# Patient Record
Sex: FEMALE | URBAN - METROPOLITAN AREA
[De-identification: names, ages, dates, MRNs, and addresses within clinical notes are randomized per-mention and may not be internally consistent; named-entity substitution may affect disease eponyms.]

---

## 2023-04-11 ENCOUNTER — APPOINTMENT (OUTPATIENT)
Dept: URBAN - METROPOLITAN AREA CLINIC 218 | Age: 88
Setting detail: DERMATOLOGY
End: 2023-04-12

## 2023-04-11 DIAGNOSIS — D36.1 BENIGN NEOPLASM OF PERIPHERAL NERVES AND AUTONOMIC NERVOUS SYSTEM: ICD-10-CM

## 2023-04-11 DIAGNOSIS — Z71.89 OTHER SPECIFIED COUNSELING: ICD-10-CM

## 2023-04-11 DIAGNOSIS — L57.8 OTHER SKIN CHANGES DUE TO CHRONIC EXPOSURE TO NONIONIZING RADIATION: ICD-10-CM

## 2023-04-11 DIAGNOSIS — L82.1 OTHER SEBORRHEIC KERATOSIS: ICD-10-CM

## 2023-04-11 DIAGNOSIS — L85.3 XEROSIS CUTIS: ICD-10-CM

## 2023-04-11 DIAGNOSIS — Z85.828 PERSONAL HISTORY OF OTHER MALIGNANT NEOPLASM OF SKIN: ICD-10-CM

## 2023-04-11 DIAGNOSIS — D18.0 HEMANGIOMA: ICD-10-CM

## 2023-04-11 DIAGNOSIS — L73.8 OTHER SPECIFIED FOLLICULAR DISORDERS: ICD-10-CM

## 2023-04-11 DIAGNOSIS — L57.0 ACTINIC KERATOSIS: ICD-10-CM

## 2023-04-11 PROBLEM — D23.71 OTHER BENIGN NEOPLASM OF SKIN OF RIGHT LOWER LIMB, INCLUDING HIP: Status: ACTIVE | Noted: 2023-04-11

## 2023-04-11 PROBLEM — D23.72 OTHER BENIGN NEOPLASM OF SKIN OF LEFT LOWER LIMB, INCLUDING HIP: Status: ACTIVE | Noted: 2023-04-11

## 2023-04-11 PROBLEM — C44.311 BASAL CELL CARCINOMA OF SKIN OF NOSE: Status: ACTIVE | Noted: 2023-04-11

## 2023-04-11 PROBLEM — D18.01 HEMANGIOMA OF SKIN AND SUBCUTANEOUS TISSUE: Status: ACTIVE | Noted: 2023-04-11

## 2023-04-11 PROBLEM — D36.12 BENIGN NEOPLASM OF PERIPHERAL NERVES AND AUTONOMIC NERVOUS SYSTEM, UPPER LIMB, INCLUDING SHOULDER: Status: ACTIVE | Noted: 2023-04-11

## 2023-04-11 PROCEDURE — OTHER LIQUID NITROGEN: OTHER

## 2023-04-11 PROCEDURE — OTHER MONITORING: OTHER

## 2023-04-11 PROCEDURE — 99213 OFFICE O/P EST LOW 20 MIN: CPT | Mod: 25

## 2023-04-11 PROCEDURE — OTHER COUNSELING: OTHER

## 2023-04-11 PROCEDURE — 17000 DESTRUCT PREMALG LESION: CPT

## 2023-04-11 ASSESSMENT — LOCATION DETAILED DESCRIPTION DERM
LOCATION DETAILED: LEFT SUPERIOR FOREHEAD
LOCATION DETAILED: INFERIOR MID FOREHEAD
LOCATION DETAILED: LEFT ANTERIOR DISTAL UPPER ARM
LOCATION DETAILED: RIGHT INFERIOR MEDIAL UPPER BACK
LOCATION DETAILED: LOWER STERNUM
LOCATION DETAILED: RIGHT SUPERIOR MEDIAL UPPER BACK
LOCATION DETAILED: LEFT INFERIOR MEDIAL FOREHEAD
LOCATION DETAILED: RIGHT ANTERIOR DISTAL UPPER ARM
LOCATION DETAILED: LEFT SUPERIOR ANTERIOR NECK
LOCATION DETAILED: UPPER STERNUM
LOCATION DETAILED: RIGHT NASAL ALA
LOCATION DETAILED: LEFT LATERAL SUPERIOR CHEST
LOCATION DETAILED: RIGHT VENTRAL DISTAL FOREARM
LOCATION DETAILED: INFERIOR THORACIC SPINE
LOCATION DETAILED: RIGHT MEDIAL FRONTAL SCALP

## 2023-04-11 ASSESSMENT — LOCATION SIMPLE DESCRIPTION DERM
LOCATION SIMPLE: LEFT UPPER ARM
LOCATION SIMPLE: RIGHT NOSE
LOCATION SIMPLE: RIGHT SCALP
LOCATION SIMPLE: RIGHT UPPER BACK
LOCATION SIMPLE: RIGHT UPPER ARM
LOCATION SIMPLE: INFERIOR FOREHEAD
LOCATION SIMPLE: RIGHT FOREARM
LOCATION SIMPLE: UPPER BACK
LOCATION SIMPLE: LEFT FOREHEAD
LOCATION SIMPLE: LEFT ANTERIOR NECK
LOCATION SIMPLE: CHEST

## 2023-04-11 ASSESSMENT — LOCATION ZONE DERM
LOCATION ZONE: NOSE
LOCATION ZONE: NECK
LOCATION ZONE: TRUNK
LOCATION ZONE: FACE
LOCATION ZONE: SCALP
LOCATION ZONE: ARM

## 2023-04-11 NOTE — PROCEDURE: LIQUID NITROGEN
Detail Level: Detailed
Post-Care Instructions: I reviewed with the patient in detail post-care instructions. Patient is to wear sunprotection, and avoid picking at any of the treated lesions. Pt may apply Vaseline to crusted or scabbing areas.
Consent: The patient's verbal consent was obtained including but not limited to risks of crusting, scabbing, blistering, scarring, darker or lighter pigmentary change.
Show Aperture Variable?: Yes
Duration Of Freeze Thaw-Cycle (Seconds): 2
Number Of Freeze-Thaw Cycles: 1 freeze-thaw cycle
Render Note In Bullet Format When Appropriate: No
Application Tool (Optional): Cry-AC

## 2024-04-15 ENCOUNTER — APPOINTMENT (OUTPATIENT)
Dept: URBAN - METROPOLITAN AREA CLINIC 218 | Age: 89
Setting detail: DERMATOLOGY
End: 2024-04-15

## 2024-04-15 DIAGNOSIS — D36.1 BENIGN NEOPLASM OF PERIPHERAL NERVES AND AUTONOMIC NERVOUS SYSTEM: ICD-10-CM

## 2024-04-15 DIAGNOSIS — Z86.007 PERSONAL HISTORY OF IN-SITU NEOPLASM OF SKIN: ICD-10-CM

## 2024-04-15 DIAGNOSIS — L73.8 OTHER SPECIFIED FOLLICULAR DISORDERS: ICD-10-CM

## 2024-04-15 DIAGNOSIS — L82.1 OTHER SEBORRHEIC KERATOSIS: ICD-10-CM

## 2024-04-15 DIAGNOSIS — L81.7 PIGMENTED PURPURIC DERMATOSIS: ICD-10-CM

## 2024-04-15 DIAGNOSIS — Z85.828 PERSONAL HISTORY OF OTHER MALIGNANT NEOPLASM OF SKIN: ICD-10-CM

## 2024-04-15 DIAGNOSIS — L57.8 OTHER SKIN CHANGES DUE TO CHRONIC EXPOSURE TO NONIONIZING RADIATION: ICD-10-CM

## 2024-04-15 DIAGNOSIS — L90.5 SCAR CONDITIONS AND FIBROSIS OF SKIN: ICD-10-CM

## 2024-04-15 DIAGNOSIS — D18.0 HEMANGIOMA: ICD-10-CM

## 2024-04-15 PROBLEM — C44.311 BASAL CELL CARCINOMA OF SKIN OF NOSE: Status: ACTIVE | Noted: 2024-04-15

## 2024-04-15 PROBLEM — D36.12 BENIGN NEOPLASM OF PERIPHERAL NERVES AND AUTONOMIC NERVOUS SYSTEM, UPPER LIMB, INCLUDING SHOULDER: Status: ACTIVE | Noted: 2024-04-15

## 2024-04-15 PROBLEM — D23.71 OTHER BENIGN NEOPLASM OF SKIN OF RIGHT LOWER LIMB, INCLUDING HIP: Status: ACTIVE | Noted: 2024-04-15

## 2024-04-15 PROBLEM — D18.01 HEMANGIOMA OF SKIN AND SUBCUTANEOUS TISSUE: Status: ACTIVE | Noted: 2024-04-15

## 2024-04-15 PROBLEM — D23.72 OTHER BENIGN NEOPLASM OF SKIN OF LEFT LOWER LIMB, INCLUDING HIP: Status: ACTIVE | Noted: 2024-04-15

## 2024-04-15 PROCEDURE — OTHER MONITORING: OTHER

## 2024-04-15 PROCEDURE — OTHER COUNSELING: OTHER

## 2024-04-15 PROCEDURE — 99213 OFFICE O/P EST LOW 20 MIN: CPT

## 2024-04-15 ASSESSMENT — LOCATION DETAILED DESCRIPTION DERM
LOCATION DETAILED: LEFT ANTERIOR DISTAL UPPER ARM
LOCATION DETAILED: RIGHT DISTAL PRETIBIAL REGION
LOCATION DETAILED: LEFT CENTRAL MALAR CHEEK
LOCATION DETAILED: RIGHT INFERIOR CENTRAL MALAR CHEEK
LOCATION DETAILED: RIGHT SUPERIOR MEDIAL UPPER BACK
LOCATION DETAILED: LEFT SUPERIOR ANTERIOR NECK
LOCATION DETAILED: RIGHT INFERIOR MEDIAL UPPER BACK
LOCATION DETAILED: RIGHT SUPERIOR PREAURICULAR CHEEK
LOCATION DETAILED: RIGHT VENTRAL DISTAL FOREARM
LOCATION DETAILED: RIGHT ANTERIOR DISTAL UPPER ARM
LOCATION DETAILED: LEFT SUPERIOR PREAURICULAR CHEEK
LOCATION DETAILED: INFERIOR MID FOREHEAD
LOCATION DETAILED: RIGHT CENTRAL MALAR CHEEK
LOCATION DETAILED: LEFT DISTAL PRETIBIAL REGION
LOCATION DETAILED: RIGHT CENTRAL PARIETAL SCALP
LOCATION DETAILED: MID POSTERIOR NECK
LOCATION DETAILED: LOWER STERNUM
LOCATION DETAILED: RIGHT MEDIAL FRONTAL SCALP
LOCATION DETAILED: LEFT INFERIOR MEDIAL FOREHEAD
LOCATION DETAILED: UPPER STERNUM
LOCATION DETAILED: LEFT CHIN

## 2024-04-15 ASSESSMENT — LOCATION SIMPLE DESCRIPTION DERM
LOCATION SIMPLE: INFERIOR FOREHEAD
LOCATION SIMPLE: CHIN
LOCATION SIMPLE: POSTERIOR NECK
LOCATION SIMPLE: RIGHT PRETIBIAL REGION
LOCATION SIMPLE: LEFT ANTERIOR NECK
LOCATION SIMPLE: RIGHT CHEEK
LOCATION SIMPLE: SCALP
LOCATION SIMPLE: CHEST
LOCATION SIMPLE: LEFT CHEEK
LOCATION SIMPLE: RIGHT SCALP
LOCATION SIMPLE: LEFT UPPER ARM
LOCATION SIMPLE: RIGHT FOREARM
LOCATION SIMPLE: LEFT FOREHEAD
LOCATION SIMPLE: RIGHT UPPER ARM
LOCATION SIMPLE: RIGHT UPPER BACK
LOCATION SIMPLE: LEFT PRETIBIAL REGION

## 2024-04-15 ASSESSMENT — LOCATION ZONE DERM
LOCATION ZONE: ARM
LOCATION ZONE: LEG
LOCATION ZONE: SCALP
LOCATION ZONE: FACE
LOCATION ZONE: NECK
LOCATION ZONE: TRUNK

## 2024-04-15 NOTE — PROCEDURE: COUNSELING
Detail Level: Zone
Detail Level: Detailed
Patient Specific Counseling (Will Not Stick From Patient To Patient): Patient understands the risks of not treating the BCC.  She agrees to routine monitoring Q6 months, will RTO if lesion becomes bothersome or grows.

## 2024-10-15 ENCOUNTER — APPOINTMENT (OUTPATIENT)
Dept: URBAN - METROPOLITAN AREA CLINIC 218 | Age: 89
Setting detail: DERMATOLOGY
End: 2024-10-16

## 2024-10-15 DIAGNOSIS — D36.1 BENIGN NEOPLASM OF PERIPHERAL NERVES AND AUTONOMIC NERVOUS SYSTEM: ICD-10-CM

## 2024-10-15 DIAGNOSIS — Z86.007 PERSONAL HISTORY OF IN-SITU NEOPLASM OF SKIN: ICD-10-CM

## 2024-10-15 DIAGNOSIS — D18.0 HEMANGIOMA: ICD-10-CM

## 2024-10-15 DIAGNOSIS — L57.8 OTHER SKIN CHANGES DUE TO CHRONIC EXPOSURE TO NONIONIZING RADIATION: ICD-10-CM

## 2024-10-15 DIAGNOSIS — L81.7 PIGMENTED PURPURIC DERMATOSIS: ICD-10-CM

## 2024-10-15 DIAGNOSIS — L82.1 OTHER SEBORRHEIC KERATOSIS: ICD-10-CM

## 2024-10-15 DIAGNOSIS — Z85.828 PERSONAL HISTORY OF OTHER MALIGNANT NEOPLASM OF SKIN: ICD-10-CM

## 2024-10-15 DIAGNOSIS — L73.8 OTHER SPECIFIED FOLLICULAR DISORDERS: ICD-10-CM

## 2024-10-15 DIAGNOSIS — D485 NEOPLASM OF UNCERTAIN BEHAVIOR OF SKIN: ICD-10-CM

## 2024-10-15 PROBLEM — D18.01 HEMANGIOMA OF SKIN AND SUBCUTANEOUS TISSUE: Status: ACTIVE | Noted: 2024-10-15

## 2024-10-15 PROBLEM — D23.72 OTHER BENIGN NEOPLASM OF SKIN OF LEFT LOWER LIMB, INCLUDING HIP: Status: ACTIVE | Noted: 2024-10-15

## 2024-10-15 PROBLEM — D23.71 OTHER BENIGN NEOPLASM OF SKIN OF RIGHT LOWER LIMB, INCLUDING HIP: Status: ACTIVE | Noted: 2024-10-15

## 2024-10-15 PROBLEM — D48.5 NEOPLASM OF UNCERTAIN BEHAVIOR OF SKIN: Status: ACTIVE | Noted: 2024-10-15

## 2024-10-15 PROBLEM — C44.311 BASAL CELL CARCINOMA OF SKIN OF NOSE: Status: ACTIVE | Noted: 2024-10-15

## 2024-10-15 PROBLEM — D36.12 BENIGN NEOPLASM OF PERIPHERAL NERVES AND AUTONOMIC NERVOUS SYSTEM, UPPER LIMB, INCLUDING SHOULDER: Status: ACTIVE | Noted: 2024-10-15

## 2024-10-15 PROCEDURE — 11102 TANGNTL BX SKIN SINGLE LES: CPT

## 2024-10-15 PROCEDURE — OTHER BIOPSY BY SHAVE METHOD: OTHER

## 2024-10-15 PROCEDURE — OTHER COUNSELING: OTHER

## 2024-10-15 PROCEDURE — OTHER MONITORING: OTHER

## 2024-10-15 PROCEDURE — 99213 OFFICE O/P EST LOW 20 MIN: CPT | Mod: 25

## 2024-10-15 ASSESSMENT — LOCATION SIMPLE DESCRIPTION DERM
LOCATION SIMPLE: RIGHT UPPER BACK
LOCATION SIMPLE: LEFT PRETIBIAL REGION
LOCATION SIMPLE: INFERIOR FOREHEAD
LOCATION SIMPLE: RIGHT UPPER ARM
LOCATION SIMPLE: RIGHT FOREARM
LOCATION SIMPLE: LEFT UPPER ARM
LOCATION SIMPLE: POSTERIOR NECK
LOCATION SIMPLE: LEFT ANTERIOR NECK
LOCATION SIMPLE: RIGHT PRETIBIAL REGION
LOCATION SIMPLE: LEFT FOREHEAD
LOCATION SIMPLE: LEFT CHEEK
LOCATION SIMPLE: CHIN
LOCATION SIMPLE: RIGHT SCALP
LOCATION SIMPLE: RIGHT CHEEK
LOCATION SIMPLE: SCALP
LOCATION SIMPLE: CHEST

## 2024-10-15 ASSESSMENT — LOCATION DETAILED DESCRIPTION DERM
LOCATION DETAILED: LEFT DISTAL PRETIBIAL REGION
LOCATION DETAILED: LEFT CENTRAL MALAR CHEEK
LOCATION DETAILED: UPPER STERNUM
LOCATION DETAILED: RIGHT ANTERIOR DISTAL UPPER ARM
LOCATION DETAILED: RIGHT INFERIOR MEDIAL UPPER BACK
LOCATION DETAILED: RIGHT SUPERIOR MEDIAL UPPER BACK
LOCATION DETAILED: RIGHT CENTRAL PARIETAL SCALP
LOCATION DETAILED: LEFT CHIN
LOCATION DETAILED: LEFT INFERIOR MEDIAL FOREHEAD
LOCATION DETAILED: LEFT SUPERIOR PREAURICULAR CHEEK
LOCATION DETAILED: RIGHT VENTRAL DISTAL FOREARM
LOCATION DETAILED: RIGHT CENTRAL MALAR CHEEK
LOCATION DETAILED: MID POSTERIOR NECK
LOCATION DETAILED: RIGHT MEDIAL FRONTAL SCALP
LOCATION DETAILED: LEFT SUPERIOR ANTERIOR NECK
LOCATION DETAILED: RIGHT DISTAL PRETIBIAL REGION
LOCATION DETAILED: RIGHT SUPERIOR PREAURICULAR CHEEK
LOCATION DETAILED: RIGHT CENTRAL FRONTAL SCALP
LOCATION DETAILED: LOWER STERNUM
LOCATION DETAILED: LEFT ANTERIOR DISTAL UPPER ARM
LOCATION DETAILED: INFERIOR MID FOREHEAD

## 2024-10-15 ASSESSMENT — LOCATION ZONE DERM
LOCATION ZONE: SCALP
LOCATION ZONE: NECK
LOCATION ZONE: ARM
LOCATION ZONE: TRUNK
LOCATION ZONE: LEG
LOCATION ZONE: FACE

## 2024-10-15 NOTE — PROCEDURE: BIOPSY BY SHAVE METHOD
Detail Level: Detailed
Depth Of Biopsy: dermis
Was A Bandage Applied: Yes
Size Of Lesion In Cm: 0
Biopsy Type: H and E
Biopsy Method: Dermablade
Anesthesia Type: 1% lidocaine with 1:200,000 epinephrine and a 1:10 solution of 8.4% sodium bicarbonate and 408mcg clindamycin/ml
Anesthesia Volume In Cc: 1
Hemostasis: Electrocautery
Wound Care: Petrolatum
Dressing: pressure dressing with telfa
Destruction After The Procedure: No
Type Of Destruction Used: Curettage
Curettage Text: The wound bed was treated with curettage after the biopsy was performed.
Cryotherapy Text: The wound bed was treated with cryotherapy after the biopsy was performed.
Electrodesiccation Text: The wound bed was treated with electrodesiccation after the biopsy was performed.
Electrodesiccation And Curettage Text: The wound bed was treated with electrodesiccation and curettage after the biopsy was performed.
Silver Nitrate Text: The wound bed was treated with silver nitrate after the biopsy was performed.
Lab: [id number]
Lab Facility: [id number]
Path Notes (To The Dermatopathologist): 0.7 cm pink crusted macule r.o bcc
Consent: Verbal consent was obtained and risks were reviewed including but not limited to scarring, infection, bleeding, scabbing, incomplete removal.
Post-Care Instructions: I reviewed with the patient in detail post-care instructions. Patient is to keep the biopsy site dry overnight, and then apply Vaseline once daily until healed.
Notification Instructions: Patient will be notified of biopsy results. However, patient instructed to call the office if not contacted within 2 weeks.
Billing Type: Third-Party Bill
Information: Selecting Yes will display possible errors in your note based on the variables you have selected. This validation is only offered as a suggestion for you. PLEASE NOTE THAT THE VALIDATION TEXT WILL BE REMOVED WHEN YOU FINALIZE YOUR NOTE. IF YOU WANT TO FAX A PRELIMINARY NOTE YOU WILL NEED TO TOGGLE THIS TO 'NO' IF YOU DO NOT WANT IT IN YOUR FAXED NOTE.

## 2024-10-15 NOTE — PROCEDURE: COUNSELING
Detail Level: Zone
Patient Specific Counseling (Will Not Stick From Patient To Patient): Patient understands the risks of not treating the BCC.  She agrees to routine monitoring Q6 months, will RTO if lesion becomes bothersome or grows.
Detail Level: Detailed

## 2025-04-16 ENCOUNTER — APPOINTMENT (OUTPATIENT)
Dept: URBAN - METROPOLITAN AREA CLINIC 218 | Age: OVER 89
Setting detail: DERMATOLOGY
End: 2025-04-23

## 2025-04-16 DIAGNOSIS — Z86.007 PERSONAL HISTORY OF IN-SITU NEOPLASM OF SKIN: ICD-10-CM

## 2025-04-16 DIAGNOSIS — L73.8 OTHER SPECIFIED FOLLICULAR DISORDERS: ICD-10-CM

## 2025-04-16 DIAGNOSIS — D18.0 HEMANGIOMA: ICD-10-CM

## 2025-04-16 DIAGNOSIS — L82.1 OTHER SEBORRHEIC KERATOSIS: ICD-10-CM

## 2025-04-16 DIAGNOSIS — L81.7 PIGMENTED PURPURIC DERMATOSIS: ICD-10-CM

## 2025-04-16 DIAGNOSIS — Z85.828 PERSONAL HISTORY OF OTHER MALIGNANT NEOPLASM OF SKIN: ICD-10-CM

## 2025-04-16 DIAGNOSIS — L57.8 OTHER SKIN CHANGES DUE TO CHRONIC EXPOSURE TO NONIONIZING RADIATION: ICD-10-CM

## 2025-04-16 DIAGNOSIS — D36.1 BENIGN NEOPLASM OF PERIPHERAL NERVES AND AUTONOMIC NERVOUS SYSTEM: ICD-10-CM

## 2025-04-16 PROBLEM — D23.72 OTHER BENIGN NEOPLASM OF SKIN OF LEFT LOWER LIMB, INCLUDING HIP: Status: ACTIVE | Noted: 2025-04-16

## 2025-04-16 PROBLEM — D23.71 OTHER BENIGN NEOPLASM OF SKIN OF RIGHT LOWER LIMB, INCLUDING HIP: Status: ACTIVE | Noted: 2025-04-16

## 2025-04-16 PROBLEM — C44.41 BASAL CELL CARCINOMA OF SKIN OF SCALP AND NECK: Status: ACTIVE | Noted: 2025-04-16

## 2025-04-16 PROBLEM — C44.311 BASAL CELL CARCINOMA OF SKIN OF NOSE: Status: ACTIVE | Noted: 2025-04-16

## 2025-04-16 PROBLEM — D36.12 BENIGN NEOPLASM OF PERIPHERAL NERVES AND AUTONOMIC NERVOUS SYSTEM, UPPER LIMB, INCLUDING SHOULDER: Status: ACTIVE | Noted: 2025-04-16

## 2025-04-16 PROBLEM — D18.01 HEMANGIOMA OF SKIN AND SUBCUTANEOUS TISSUE: Status: ACTIVE | Noted: 2025-04-16

## 2025-04-16 PROBLEM — C44.01 BASAL CELL CARCINOMA OF SKIN OF LIP: Status: ACTIVE | Noted: 2025-04-16

## 2025-04-16 PROCEDURE — OTHER COUNSELING: OTHER

## 2025-04-16 PROCEDURE — 99213 OFFICE O/P EST LOW 20 MIN: CPT

## 2025-04-16 PROCEDURE — OTHER MONITORING: OTHER

## 2025-04-16 ASSESSMENT — LOCATION ZONE DERM
LOCATION ZONE: SCALP
LOCATION ZONE: LEG
LOCATION ZONE: TRUNK
LOCATION ZONE: ARM
LOCATION ZONE: NECK
LOCATION ZONE: FACE

## 2025-04-16 ASSESSMENT — LOCATION DETAILED DESCRIPTION DERM
LOCATION DETAILED: RIGHT CENTRAL MALAR CHEEK
LOCATION DETAILED: LEFT SUPERIOR PREAURICULAR CHEEK
LOCATION DETAILED: LEFT ANTERIOR DISTAL UPPER ARM
LOCATION DETAILED: LEFT DISTAL PRETIBIAL REGION
LOCATION DETAILED: LEFT CENTRAL MALAR CHEEK
LOCATION DETAILED: RIGHT SUPERIOR PREAURICULAR CHEEK
LOCATION DETAILED: LEFT INFERIOR MEDIAL FOREHEAD
LOCATION DETAILED: RIGHT CENTRAL PARIETAL SCALP
LOCATION DETAILED: UPPER STERNUM
LOCATION DETAILED: RIGHT INFERIOR MEDIAL UPPER BACK
LOCATION DETAILED: LOWER STERNUM
LOCATION DETAILED: LEFT SUPERIOR ANTERIOR NECK
LOCATION DETAILED: RIGHT MEDIAL FRONTAL SCALP
LOCATION DETAILED: RIGHT SUPERIOR MEDIAL UPPER BACK
LOCATION DETAILED: RIGHT DISTAL PRETIBIAL REGION
LOCATION DETAILED: RIGHT ANTERIOR DISTAL UPPER ARM
LOCATION DETAILED: MID POSTERIOR NECK
LOCATION DETAILED: LEFT CHIN
LOCATION DETAILED: RIGHT VENTRAL DISTAL FOREARM
LOCATION DETAILED: INFERIOR MID FOREHEAD

## 2025-04-16 ASSESSMENT — LOCATION SIMPLE DESCRIPTION DERM
LOCATION SIMPLE: LEFT ANTERIOR NECK
LOCATION SIMPLE: LEFT FOREHEAD
LOCATION SIMPLE: CHIN
LOCATION SIMPLE: RIGHT PRETIBIAL REGION
LOCATION SIMPLE: CHEST
LOCATION SIMPLE: RIGHT UPPER ARM
LOCATION SIMPLE: RIGHT CHEEK
LOCATION SIMPLE: SCALP
LOCATION SIMPLE: RIGHT UPPER BACK
LOCATION SIMPLE: RIGHT FOREARM
LOCATION SIMPLE: POSTERIOR NECK
LOCATION SIMPLE: INFERIOR FOREHEAD
LOCATION SIMPLE: RIGHT SCALP
LOCATION SIMPLE: LEFT CHEEK
LOCATION SIMPLE: LEFT PRETIBIAL REGION
LOCATION SIMPLE: LEFT UPPER ARM